# Patient Record
Sex: MALE | Race: WHITE | NOT HISPANIC OR LATINO | Employment: OTHER | URBAN - METROPOLITAN AREA
[De-identification: names, ages, dates, MRNs, and addresses within clinical notes are randomized per-mention and may not be internally consistent; named-entity substitution may affect disease eponyms.]

---

## 2017-04-12 ENCOUNTER — AMBULATORY - HEALTHEAST (OUTPATIENT)
Dept: INTERNAL MEDICINE | Facility: CLINIC | Age: 72
End: 2017-04-12

## 2017-04-12 DIAGNOSIS — Z12.5 ENCOUNTER FOR SCREENING FOR MALIGNANT NEOPLASM OF PROSTATE: ICD-10-CM

## 2017-04-12 DIAGNOSIS — Z00.00 PREVENTATIVE HEALTH CARE: ICD-10-CM

## 2017-04-12 DIAGNOSIS — E78.5 HYPERLIPIDEMIA: ICD-10-CM

## 2017-04-13 ENCOUNTER — AMBULATORY - HEALTHEAST (OUTPATIENT)
Dept: LAB | Facility: CLINIC | Age: 72
End: 2017-04-13

## 2017-04-13 DIAGNOSIS — Z00.00 PREVENTATIVE HEALTH CARE: ICD-10-CM

## 2017-04-13 DIAGNOSIS — E78.5 HYPERLIPIDEMIA: ICD-10-CM

## 2017-04-13 DIAGNOSIS — Z12.5 ENCOUNTER FOR SCREENING FOR MALIGNANT NEOPLASM OF PROSTATE: ICD-10-CM

## 2017-04-13 LAB
CHOLEST SERPL-MCNC: 220 MG/DL
FASTING STATUS PATIENT QL REPORTED: YES
HDLC SERPL-MCNC: 66 MG/DL
LDLC SERPL CALC-MCNC: 135 MG/DL
PSA SERPL-MCNC: 1.7 NG/ML (ref 0–6.5)
TRIGL SERPL-MCNC: 95 MG/DL

## 2017-04-19 ENCOUNTER — OFFICE VISIT - HEALTHEAST (OUTPATIENT)
Dept: INTERNAL MEDICINE | Facility: CLINIC | Age: 72
End: 2017-04-19

## 2017-04-19 DIAGNOSIS — Z00.00 ROUTINE GENERAL MEDICAL EXAMINATION AT A HEALTH CARE FACILITY: ICD-10-CM

## 2017-04-19 DIAGNOSIS — B00.1 HERPES LABIALIS: ICD-10-CM

## 2017-04-19 DIAGNOSIS — E78.5 HYPERLIPIDEMIA: ICD-10-CM

## 2017-04-19 LAB
ATRIAL RATE - MUSE: 64 BPM
DIASTOLIC BLOOD PRESSURE - MUSE: NORMAL MMHG
INTERPRETATION ECG - MUSE: NORMAL
P AXIS - MUSE: -2 DEGREES
PR INTERVAL - MUSE: 150 MS
QRS DURATION - MUSE: 92 MS
QT - MUSE: 402 MS
QTC - MUSE: 414 MS
R AXIS - MUSE: -1 DEGREES
SYSTOLIC BLOOD PRESSURE - MUSE: NORMAL MMHG
T AXIS - MUSE: 25 DEGREES
VENTRICULAR RATE- MUSE: 64 BPM

## 2017-04-19 ASSESSMENT — MIFFLIN-ST. JEOR: SCORE: 1636.72

## 2017-04-26 ENCOUNTER — COMMUNICATION - HEALTHEAST (OUTPATIENT)
Dept: INTERNAL MEDICINE | Facility: CLINIC | Age: 72
End: 2017-04-26

## 2017-04-30 ENCOUNTER — COMMUNICATION - HEALTHEAST (OUTPATIENT)
Dept: INTERNAL MEDICINE | Facility: CLINIC | Age: 72
End: 2017-04-30

## 2017-05-10 ENCOUNTER — COMMUNICATION - HEALTHEAST (OUTPATIENT)
Dept: INTERNAL MEDICINE | Facility: CLINIC | Age: 72
End: 2017-05-10

## 2017-05-22 ENCOUNTER — RECORDS - HEALTHEAST (OUTPATIENT)
Dept: ADMINISTRATIVE | Facility: OTHER | Age: 72
End: 2017-05-22

## 2017-06-27 ENCOUNTER — RECORDS - HEALTHEAST (OUTPATIENT)
Dept: ADMINISTRATIVE | Facility: OTHER | Age: 72
End: 2017-06-27

## 2017-11-26 ENCOUNTER — COMMUNICATION - HEALTHEAST (OUTPATIENT)
Dept: INTERNAL MEDICINE | Facility: CLINIC | Age: 72
End: 2017-11-26

## 2017-11-26 DIAGNOSIS — Z00.00 ROUTINE GENERAL MEDICAL EXAMINATION AT A HEALTH CARE FACILITY: ICD-10-CM

## 2017-11-26 DIAGNOSIS — B00.1 HERPES LABIALIS: ICD-10-CM

## 2018-04-12 ENCOUNTER — RECORDS - HEALTHEAST (OUTPATIENT)
Dept: ADMINISTRATIVE | Facility: OTHER | Age: 73
End: 2018-04-12

## 2018-04-25 ENCOUNTER — AMBULATORY - HEALTHEAST (OUTPATIENT)
Dept: INTERNAL MEDICINE | Facility: CLINIC | Age: 73
End: 2018-04-25

## 2018-04-25 DIAGNOSIS — Z00.00 MEDICARE ANNUAL WELLNESS VISIT, SUBSEQUENT: ICD-10-CM

## 2018-04-25 DIAGNOSIS — Z12.5 ENCOUNTER FOR SCREENING FOR MALIGNANT NEOPLASM OF PROSTATE: ICD-10-CM

## 2018-04-25 DIAGNOSIS — E78.5 HYPERLIPIDEMIA: ICD-10-CM

## 2018-04-26 ENCOUNTER — AMBULATORY - HEALTHEAST (OUTPATIENT)
Dept: LAB | Facility: CLINIC | Age: 73
End: 2018-04-26

## 2018-04-26 DIAGNOSIS — Z12.5 ENCOUNTER FOR SCREENING FOR MALIGNANT NEOPLASM OF PROSTATE: ICD-10-CM

## 2018-04-26 DIAGNOSIS — E78.5 HYPERLIPIDEMIA: ICD-10-CM

## 2018-04-26 DIAGNOSIS — Z00.00 MEDICARE ANNUAL WELLNESS VISIT, SUBSEQUENT: ICD-10-CM

## 2018-04-26 LAB
ALBUMIN SERPL-MCNC: 4.1 G/DL (ref 3.5–5)
ALBUMIN UR-MCNC: NEGATIVE MG/DL
ALP SERPL-CCNC: 54 U/L (ref 45–120)
ALT SERPL W P-5'-P-CCNC: 29 U/L (ref 0–45)
ANION GAP SERPL CALCULATED.3IONS-SCNC: 9 MMOL/L (ref 5–18)
APPEARANCE UR: CLEAR
AST SERPL W P-5'-P-CCNC: 25 U/L (ref 0–40)
BASOPHILS # BLD AUTO: 0 THOU/UL (ref 0–0.2)
BASOPHILS NFR BLD AUTO: 1 % (ref 0–2)
BILIRUB DIRECT SERPL-MCNC: 0.3 MG/DL
BILIRUB SERPL-MCNC: 0.9 MG/DL (ref 0–1)
BILIRUB UR QL STRIP: NEGATIVE
BUN SERPL-MCNC: 17 MG/DL (ref 8–28)
CALCIUM SERPL-MCNC: 9.7 MG/DL (ref 8.5–10.5)
CHLORIDE BLD-SCNC: 104 MMOL/L (ref 98–107)
CHOLEST SERPL-MCNC: 224 MG/DL
CO2 SERPL-SCNC: 28 MMOL/L (ref 22–31)
COLOR UR AUTO: YELLOW
CREAT SERPL-MCNC: 0.84 MG/DL (ref 0.7–1.3)
EOSINOPHIL # BLD AUTO: 0.1 THOU/UL (ref 0–0.4)
EOSINOPHIL NFR BLD AUTO: 2 % (ref 0–6)
ERYTHROCYTE [DISTWIDTH] IN BLOOD BY AUTOMATED COUNT: 12.4 % (ref 11–14.5)
ERYTHROCYTE [SEDIMENTATION RATE] IN BLOOD BY WESTERGREN METHOD: 2 MM/HR (ref 0–15)
FASTING STATUS PATIENT QL REPORTED: YES
GFR SERPL CREATININE-BSD FRML MDRD: >60 ML/MIN/1.73M2
GLUCOSE BLD-MCNC: 109 MG/DL (ref 70–125)
GLUCOSE UR STRIP-MCNC: NEGATIVE MG/DL
HCT VFR BLD AUTO: 43.7 % (ref 40–54)
HDLC SERPL-MCNC: 60 MG/DL
HGB BLD-MCNC: 15.2 G/DL (ref 14–18)
HGB UR QL STRIP: NEGATIVE
KETONES UR STRIP-MCNC: NEGATIVE MG/DL
LDLC SERPL CALC-MCNC: 145 MG/DL
LEUKOCYTE ESTERASE UR QL STRIP: NEGATIVE
LYMPHOCYTES # BLD AUTO: 1.6 THOU/UL (ref 0.8–4.4)
LYMPHOCYTES NFR BLD AUTO: 39 % (ref 20–40)
MCH RBC QN AUTO: 31.5 PG (ref 27–34)
MCHC RBC AUTO-ENTMCNC: 34.8 G/DL (ref 32–36)
MCV RBC AUTO: 91 FL (ref 80–100)
MONOCYTES # BLD AUTO: 0.3 THOU/UL (ref 0–0.9)
MONOCYTES NFR BLD AUTO: 8 % (ref 2–10)
NEUTROPHILS # BLD AUTO: 2.1 THOU/UL (ref 2–7.7)
NEUTROPHILS NFR BLD AUTO: 51 % (ref 50–70)
NITRATE UR QL: NEGATIVE
PH UR STRIP: 8 [PH] (ref 4.5–8)
PLATELET # BLD AUTO: 165 THOU/UL (ref 140–440)
PMV BLD AUTO: 9.6 FL (ref 8.5–12.5)
POTASSIUM BLD-SCNC: 4.5 MMOL/L (ref 3.5–5)
PROT SERPL-MCNC: 7.1 G/DL (ref 6–8)
PSA SERPL-MCNC: 1.8 NG/ML (ref 0–6.5)
RBC # BLD AUTO: 4.82 MILL/UL (ref 4.4–6.2)
SODIUM SERPL-SCNC: 141 MMOL/L (ref 136–145)
SP GR UR STRIP: 1 (ref 1–1.03)
TRIGL SERPL-MCNC: 95 MG/DL
UROBILINOGEN UR STRIP-ACNC: NORMAL
WBC: 4.1 THOU/UL (ref 4–11)

## 2018-04-30 ENCOUNTER — OFFICE VISIT - HEALTHEAST (OUTPATIENT)
Dept: INTERNAL MEDICINE | Facility: CLINIC | Age: 73
End: 2018-04-30

## 2018-04-30 DIAGNOSIS — Z00.00 MEDICARE ANNUAL WELLNESS VISIT, SUBSEQUENT: ICD-10-CM

## 2018-04-30 DIAGNOSIS — E78.5 HYPERLIPIDEMIA: ICD-10-CM

## 2018-04-30 DIAGNOSIS — G47.33 OSA (OBSTRUCTIVE SLEEP APNEA): ICD-10-CM

## 2018-04-30 DIAGNOSIS — B00.1 HERPES LABIALIS: ICD-10-CM

## 2018-04-30 LAB
ATRIAL RATE - MUSE: 67 BPM
DIASTOLIC BLOOD PRESSURE - MUSE: NORMAL MMHG
INTERPRETATION ECG - MUSE: NORMAL
P AXIS - MUSE: 73 DEGREES
PR INTERVAL - MUSE: 162 MS
QRS DURATION - MUSE: 88 MS
QT - MUSE: 400 MS
QTC - MUSE: 422 MS
R AXIS - MUSE: 3 DEGREES
SYSTOLIC BLOOD PRESSURE - MUSE: NORMAL MMHG
T AXIS - MUSE: 23 DEGREES
VENTRICULAR RATE- MUSE: 67 BPM

## 2018-07-12 ENCOUNTER — RECORDS - HEALTHEAST (OUTPATIENT)
Dept: ADMINISTRATIVE | Facility: OTHER | Age: 73
End: 2018-07-12

## 2019-07-19 ENCOUNTER — OFFICE VISIT - HEALTHEAST (OUTPATIENT)
Dept: INTERNAL MEDICINE | Facility: CLINIC | Age: 74
End: 2019-07-19

## 2019-07-19 DIAGNOSIS — Z00.00 ROUTINE GENERAL MEDICAL EXAMINATION AT A HEALTH CARE FACILITY: ICD-10-CM

## 2019-07-19 LAB
ALBUMIN SERPL-MCNC: 4.3 G/DL (ref 3.5–5)
ALBUMIN UR-MCNC: NEGATIVE MG/DL
ALP SERPL-CCNC: 71 U/L (ref 45–120)
ALT SERPL W P-5'-P-CCNC: 30 U/L (ref 0–45)
ANION GAP SERPL CALCULATED.3IONS-SCNC: 9 MMOL/L (ref 5–18)
APPEARANCE UR: CLEAR
AST SERPL W P-5'-P-CCNC: 24 U/L (ref 0–40)
BACTERIA #/AREA URNS HPF: ABNORMAL HPF
BILIRUB SERPL-MCNC: 0.8 MG/DL (ref 0–1)
BILIRUB UR QL STRIP: NEGATIVE
BUN SERPL-MCNC: 19 MG/DL (ref 8–28)
CALCIUM SERPL-MCNC: 10.2 MG/DL (ref 8.5–10.5)
CHLORIDE BLD-SCNC: 103 MMOL/L (ref 98–107)
CHOLEST SERPL-MCNC: 215 MG/DL
CO2 SERPL-SCNC: 29 MMOL/L (ref 22–31)
COLOR UR AUTO: YELLOW
CREAT SERPL-MCNC: 0.82 MG/DL (ref 0.7–1.3)
ERYTHROCYTE [DISTWIDTH] IN BLOOD BY AUTOMATED COUNT: 11.9 % (ref 11–14.5)
FASTING STATUS PATIENT QL REPORTED: YES
GFR SERPL CREATININE-BSD FRML MDRD: >60 ML/MIN/1.73M2
GLUCOSE BLD-MCNC: 100 MG/DL (ref 70–125)
GLUCOSE UR STRIP-MCNC: NEGATIVE MG/DL
HCT VFR BLD AUTO: 45.4 % (ref 40–54)
HDLC SERPL-MCNC: 68 MG/DL
HGB BLD-MCNC: 15.6 G/DL (ref 14–18)
HGB UR QL STRIP: ABNORMAL
KETONES UR STRIP-MCNC: NEGATIVE MG/DL
LDLC SERPL CALC-MCNC: 126 MG/DL
LEUKOCYTE ESTERASE UR QL STRIP: NEGATIVE
MCH RBC QN AUTO: 31.3 PG (ref 27–34)
MCHC RBC AUTO-ENTMCNC: 34.3 G/DL (ref 32–36)
MCV RBC AUTO: 91 FL (ref 80–100)
NITRATE UR QL: NEGATIVE
PH UR STRIP: 7 [PH] (ref 5–8)
PLATELET # BLD AUTO: 195 THOU/UL (ref 140–440)
PMV BLD AUTO: 7.2 FL (ref 7–10)
POTASSIUM BLD-SCNC: 4.7 MMOL/L (ref 3.5–5)
PROT SERPL-MCNC: 6.8 G/DL (ref 6–8)
PSA SERPL-MCNC: 1.6 NG/ML (ref 0–6.5)
RBC # BLD AUTO: 4.97 MILL/UL (ref 4.4–6.2)
RBC #/AREA URNS AUTO: ABNORMAL HPF
SODIUM SERPL-SCNC: 141 MMOL/L (ref 136–145)
SP GR UR STRIP: 1.02 (ref 1–1.03)
SQUAMOUS #/AREA URNS AUTO: ABNORMAL LPF
TRIGL SERPL-MCNC: 103 MG/DL
TSH SERPL DL<=0.005 MIU/L-ACNC: 0.93 UIU/ML (ref 0.3–5)
UROBILINOGEN UR STRIP-ACNC: ABNORMAL
WBC #/AREA URNS AUTO: ABNORMAL HPF
WBC: 5 THOU/UL (ref 4–11)

## 2019-07-19 ASSESSMENT — MIFFLIN-ST. JEOR: SCORE: 1623.5

## 2019-07-22 ENCOUNTER — COMMUNICATION - HEALTHEAST (OUTPATIENT)
Dept: INTERNAL MEDICINE | Facility: CLINIC | Age: 74
End: 2019-07-22

## 2019-08-05 ENCOUNTER — COMMUNICATION - HEALTHEAST (OUTPATIENT)
Dept: INTERNAL MEDICINE | Facility: CLINIC | Age: 74
End: 2019-08-05

## 2019-08-05 DIAGNOSIS — E78.5 HYPERLIPIDEMIA: ICD-10-CM

## 2019-09-12 ENCOUNTER — OFFICE VISIT - HEALTHEAST (OUTPATIENT)
Dept: CARDIOLOGY | Facility: CLINIC | Age: 74
End: 2019-09-12

## 2019-09-12 DIAGNOSIS — G47.33 OSA ON CPAP: ICD-10-CM

## 2019-09-12 DIAGNOSIS — E78.00 PURE HYPERCHOLESTEROLEMIA: ICD-10-CM

## 2019-09-12 DIAGNOSIS — R00.0 TACHYCARDIA: ICD-10-CM

## 2019-09-12 ASSESSMENT — MIFFLIN-ST. JEOR: SCORE: 1600.82

## 2019-09-13 ENCOUNTER — AMBULATORY - HEALTHEAST (OUTPATIENT)
Dept: INTERNAL MEDICINE | Facility: CLINIC | Age: 74
End: 2019-09-13

## 2019-09-19 ENCOUNTER — HOSPITAL ENCOUNTER (OUTPATIENT)
Dept: CARDIOLOGY | Facility: CLINIC | Age: 74
Discharge: HOME | End: 2019-09-19
Attending: INTERNAL MEDICINE

## 2019-09-19 LAB
CV STRESS CURRENT BP HE: NORMAL
CV STRESS CURRENT HR HE: 102
CV STRESS CURRENT HR HE: 109
CV STRESS CURRENT HR HE: 110
CV STRESS CURRENT HR HE: 112
CV STRESS CURRENT HR HE: 113
CV STRESS CURRENT HR HE: 114
CV STRESS CURRENT HR HE: 116
CV STRESS CURRENT HR HE: 116
CV STRESS CURRENT HR HE: 119
CV STRESS CURRENT HR HE: 122
CV STRESS CURRENT HR HE: 126
CV STRESS CURRENT HR HE: 132
CV STRESS CURRENT HR HE: 134
CV STRESS CURRENT HR HE: 138
CV STRESS CURRENT HR HE: 139
CV STRESS CURRENT HR HE: 146
CV STRESS CURRENT HR HE: 154
CV STRESS CURRENT HR HE: 157
CV STRESS CURRENT HR HE: 68
CV STRESS CURRENT HR HE: 85
CV STRESS CURRENT HR HE: 87
CV STRESS CURRENT HR HE: 89
CV STRESS CURRENT HR HE: 92
CV STRESS CURRENT HR HE: 99
CV STRESS CURRENT HR HE: 99
CV STRESS DEVIATION TIME HE: NORMAL
CV STRESS ECHO PERCENT HR HE: NORMAL
CV STRESS EXERCISE STAGE HE: NORMAL
CV STRESS FINAL RESTING BP HE: NORMAL
CV STRESS FINAL RESTING HR HE: 85
CV STRESS MAX HR HE: 157
CV STRESS MAX TREADMILL GRADE HE: 16
CV STRESS MAX TREADMILL SPEED HE: 4.2
CV STRESS PEAK DIA BP HE: NORMAL
CV STRESS PEAK SYS BP HE: NORMAL
CV STRESS PHASE HE: NORMAL
CV STRESS PROTOCOL HE: NORMAL
CV STRESS RESTING PT POSITION HE: NORMAL
CV STRESS RESTING PT POSITION HE: NORMAL
CV STRESS ST DEVIATION AMOUNT HE: NORMAL
CV STRESS ST DEVIATION ELEVATION HE: NORMAL
CV STRESS ST EVELATION AMOUNT HE: NORMAL
CV STRESS TEST TYPE HE: NORMAL
CV STRESS TOTAL STAGE TIME MIN 1 HE: NORMAL
ECHO EJECTION FRACTION ESTIMATED: 60 %
STRESS ECHO BASELINE BP: NORMAL
STRESS ECHO BASELINE HR: 65
STRESS ECHO CALCULATED PERCENT HR: 108 %
STRESS ECHO LAST STRESS BP: NORMAL
STRESS ECHO LAST STRESS HR: 157
STRESS ECHO POST ESTIMATED WORKLOAD: 11.9
STRESS ECHO POST EXERCISE DUR MIN: 10
STRESS ECHO POST EXERCISE DUR SEC: 11
STRESS ECHO TARGET HR: 124

## 2020-04-30 ENCOUNTER — OFFICE VISIT - HEALTHEAST (OUTPATIENT)
Dept: INTERNAL MEDICINE | Facility: CLINIC | Age: 75
End: 2020-04-30

## 2020-04-30 DIAGNOSIS — E88.810 METABOLIC SYNDROME: ICD-10-CM

## 2020-06-24 ENCOUNTER — COMMUNICATION - HEALTHEAST (OUTPATIENT)
Dept: INTERNAL MEDICINE | Facility: CLINIC | Age: 75
End: 2020-06-24

## 2020-06-24 DIAGNOSIS — B00.1 HERPES LABIALIS: ICD-10-CM

## 2020-06-24 DIAGNOSIS — E78.5 HYPERLIPIDEMIA: ICD-10-CM

## 2020-06-24 RX ORDER — VALACYCLOVIR HYDROCHLORIDE 1 G/1
TABLET, FILM COATED ORAL
Qty: 30 TABLET | Refills: 0 | Status: SHIPPED | OUTPATIENT
Start: 2020-06-24 | End: 2021-08-06

## 2020-08-27 ENCOUNTER — COMMUNICATION - HEALTHEAST (OUTPATIENT)
Dept: INTERNAL MEDICINE | Facility: CLINIC | Age: 75
End: 2020-08-27

## 2020-08-27 DIAGNOSIS — E78.5 HYPERLIPIDEMIA: ICD-10-CM

## 2020-08-27 RX ORDER — ATORVASTATIN CALCIUM 20 MG/1
20 TABLET, FILM COATED ORAL DAILY
Qty: 90 TABLET | Refills: 3 | Status: SHIPPED | OUTPATIENT
Start: 2020-08-27

## 2020-10-26 ENCOUNTER — RECORDS - HEALTHEAST (OUTPATIENT)
Dept: ADMINISTRATIVE | Facility: OTHER | Age: 75
End: 2020-10-26

## 2020-10-29 ENCOUNTER — RECORDS - HEALTHEAST (OUTPATIENT)
Dept: ADMINISTRATIVE | Facility: OTHER | Age: 75
End: 2020-10-29

## 2020-11-03 ENCOUNTER — RECORDS - HEALTHEAST (OUTPATIENT)
Dept: ADMINISTRATIVE | Facility: OTHER | Age: 75
End: 2020-11-03

## 2020-12-14 ENCOUNTER — RECORDS - HEALTHEAST (OUTPATIENT)
Dept: ADMINISTRATIVE | Facility: OTHER | Age: 75
End: 2020-12-14

## 2021-05-22 ENCOUNTER — HEALTH MAINTENANCE LETTER (OUTPATIENT)
Age: 76
End: 2021-05-22

## 2021-05-26 VITALS — DIASTOLIC BLOOD PRESSURE: 78 MMHG | SYSTOLIC BLOOD PRESSURE: 130 MMHG

## 2021-05-30 VITALS — HEIGHT: 69 IN | WEIGHT: 200.04 LBS | BODY MASS INDEX: 29.63 KG/M2

## 2021-05-30 NOTE — PATIENT INSTRUCTIONS - HE
Advance Directive  Patient s advance directive was discussed and I am comfortable with the patient s wishes.  Patient Education   Personalized Prevention Plan  You are due for the preventive services outlined below.  Your care team is available to assist you in scheduling these services.  If you have already completed any of these items, please share that information with your care team to update in your medical record.  Health Maintenance   Topic Date Due     ZOSTER VACCINES (2 of 3) 08/07/2008     COLONOSCOPY  11/13/2019     INFLUENZA VACCINE RULE BASED (1) 08/01/2019     FALL RISK ASSESSMENT  07/19/2020     TD 18+ HE  02/13/2023     ADVANCE DIRECTIVES DISCUSSED WITH PATIENT  04/30/2023     PNEUMOCOCCAL POLYSACCHARIDE VACCINE AGE 65 AND OVER  Completed     PNEUMOCOCCAL CONJUGATE VACCINE FOR ADULTS (PCV13 OR PREVNAR)  Completed

## 2021-05-30 NOTE — PROGRESS NOTES
Assessment and Plan:   Annual wellness visit.    Tachycardia yesterday.  Lasting 2 hours heart rate up to 170.  Positive family history and one son with an accessory pathway.  Needs cardiac consultation.  Orders written.    1. Routine general medical examination at a health care facility  Annual wellness visit  - HM2(CBC w/o Differential)  - Comprehensive Metabolic Panel  - Lipid Cascade  - Thyroid Stimulating Hormone (TSH)  - Urinalysis-UC if Indicated  - PSA (Prostatic-Specific Antigen), Annual Screen  - Ambulatory referral to Cardiology     The patient's current medical problems were reviewed.    I have had an Advance Directives discussion with the patient.  The following health maintenance schedule was reviewed with the patient and provided in printed form in the after visit summary:   Health Maintenance   Topic Date Due     ZOSTER VACCINES (2 of 3) 08/07/2008     COLONOSCOPY  11/13/2019     INFLUENZA VACCINE RULE BASED (1) 08/01/2019     FALL RISK ASSESSMENT  07/19/2020     TD 18+ HE  02/13/2023     ADVANCE DIRECTIVES DISCUSSED WITH PATIENT  04/30/2023     PNEUMOCOCCAL POLYSACCHARIDE VACCINE AGE 65 AND OVER  Completed     PNEUMOCOCCAL CONJUGATE VACCINE FOR ADULTS (PCV13 OR PREVNAR)  Completed        Subjective:   Chief Complaint: Parviz Joy is an 74 y.o. male here for an Annual Wellness visit.   HPI: Annual wellness visit.    Tachycardia yesterday to 170.  2 hours duration.  No associated symptoms of chest pain or shortness of breath no syncope spells no lightheadedness.  74 years of age.  Prior spell 5 years ago.  Positive family history for one son with an accessory pathway.    Blood pressure elevated on recheck times two 144/74 may be a candidate for beta-blocker therapy.    Previously seen by Dr. Chisholm.  Non-smoker.    Alcohol 5 days out of 7 days measures alcohol each night 1 to 2 ounces.    Allergies none known.  No drug allergies.  Last colonoscopy dated November 13, 2014 showed  diverticulosis prior history of colon polyps no cancer.    Appendectomy.    Left knee scope.    Hyperlipidemia.    Mother  83 heart disease.    Father  69 cancer of the sinuses disfiguring.  2 children ages 4946 daughter and son respectively.  6 grandchildren.  One son with accessory pathway.  Tachycardia spells.    Review of Systems:    Please see above.  The rest of the review of systems are negative for all systems.    Patient Care Team:  Zeke Chisholm MD as PCP - General (Internal Medicine)     Patient Active Problem List   Diagnosis     Hyperlipidemia     LANCE on CPAP     Past Medical History:   Diagnosis Date     Colon polyposis      History of liver biopsy     hx abnl lfts....fatty liver. highiron   genetic scrren negative for hemochromatisis   hep c negative     Hyperlipidemia      Sleep apnea       Past Surgical History:   Procedure Laterality Date     APPENDECTOMY      AGE 30     CHOLECYSTECTOMY       KNEE ARTHROSCOPY Left       Family History   Problem Relation Age of Onset     Stroke Mother      Peripheral vascular disease Mother      Diabetes Father      Cancer Father         NASOPHARYNGEAL     Diabetes Brother      Arrhythmia Son       Social History     Socioeconomic History     Marital status:      Spouse name: Not on file     Number of children: Not on file     Years of education: Not on file     Highest education level: Not on file   Occupational History     Not on file   Social Needs     Financial resource strain: Not on file     Food insecurity:     Worry: Not on file     Inability: Not on file     Transportation needs:     Medical: Not on file     Non-medical: Not on file   Tobacco Use     Smoking status: Former Smoker     Last attempt to quit: 1969     Years since quittin.5   Substance and Sexual Activity     Alcohol use: Yes     Drug use: Not on file     Sexual activity: Not on file   Lifestyle     Physical activity:     Days per week: Not on file     Minutes per  "session: Not on file     Stress: Not on file   Relationships     Social connections:     Talks on phone: Not on file     Gets together: Not on file     Attends Faith service: Not on file     Active member of club or organization: Not on file     Attends meetings of clubs or organizations: Not on file     Relationship status: Not on file     Intimate partner violence:     Fear of current or ex partner: Not on file     Emotionally abused: Not on file     Physically abused: Not on file     Forced sexual activity: Not on file   Other Topics Concern     Not on file   Social History Narrative    [place] 1968    [place] [place] [employer]    [employer]    [place] [employer]    2 CHILDREN    NADYA-ARRHYTHMIA, ECTOPY    ERIN    6 GRANDCHILDREN      Current Outpatient Medications   Medication Sig Dispense Refill     aspirin 81 MG EC tablet Take 162 mg by mouth daily.        atorvastatin (LIPITOR) 20 MG tablet Take 1 tablet (20 mg total) by mouth daily. 90 tablet 3     cholecalciferol, vitamin D3, 1,000 unit tablet Take 1,000 Units by mouth daily.       co-enzyme Q-10 30 mg capsule Take 100 mg by mouth daily.       multivitamin therapeutic (THERAGRAN) tablet Take 1 tablet by mouth daily.       fluticasone (FLONASE) 50 mcg/actuation nasal spray 1 spray into each nostril as needed.        valACYclovir (VALTREX) 1000 MG tablet TAKE 1 TABLET(1000 MG) BY MOUTH TWICE DAILY PRN 30 tablet 6     No current facility-administered medications for this visit.       Objective:   Vital Signs:   Visit Vitals  /74   Pulse 76   Ht 5' 9\" (1.753 m)   Wt 198 lb (89.8 kg)   SpO2 98%   BMI 29.24 kg/m         VisionScreening:  No exam data present     PHYSICAL EXAM  Chest clear to auscultation and percussion.  Heart tones regular rhythm without murmur rub or gallop.  Abdomen soft nontender no organomegaly.  No peritoneal signs.  Extremities free of edema cyanosis or clubbing.  Neck veins " nondistended no thyromegaly or scleral icterus noted, carotids full.  Skin warm and dry easily conversant good spirited.  Normal intelligence.  Neurologically intact no gross localizing findings.  Heart rate today 76 blood pressure initially 160/80 recheck 144/74.  O2 sats 98% respiratory rate 18 unlabored distinguished appearing elderly male in no acute distress male pattern baldness wears glasses.  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  Works with his son.  Skin negative lymph negative neuro negative psych normal prostate mild enlargement no nodularity genital rectal exam negative right inguinal hernia noted reducible Dr. Rey Villasenor's name given for consideration subsequent to cardiac work-up done.  No carotid bruits thyromegaly eyes ears nose throat exam negative good pulse noted in all 4 extremities mild trace edema noted.    Assessment Results 7/19/2019   Activities of Daily Living No help needed   Instrumental Activities of Daily Living No help needed   Get Up and Go Score Less than 12 seconds   Mini Cog Total Score 3   Some recent data might be hidden     A Mini-Cog score of 0-2 suggests the possibility of dementia, score of 3-5 suggests no dementia    Identified Health Risks:     Patient's advanced directive was discussed and I am comfortable with the patient's wishes.    Needs cardiac consultation for tachycardia with mild systolic hypertension.    Needs right inguinal hernia assessment found today on exam with Dr. Rey Villasenor.  May be a candidate for beta-blocker therapy for #1.

## 2021-05-31 NOTE — TELEPHONE ENCOUNTER
Refill Approved    Rx renewed per Medication Renewal Policy. Medication was last renewed on 4/30/2018 for 90/3.  Last OV 7/19/2019 PE  Britta Stone, [facility] Triage/Med Refill 8/6/2019     Requested Prescriptions   Pending Prescriptions Disp Refills     atorvastatin (LIPITOR) 20 MG tablet [Pharmacy Med Name: ATORVASTATIN 20MG TABLETS] 90 tablet 0     Sig: TAKE 1 TABLET(20 MG) BY MOUTH DAILY       Statins Refill Protocol (Hmg CoA Reductase Inhibitors) Passed - 8/5/2019 11:06 AM        Passed - PCP or prescribing provider visit in past 12 months      Last office visit with prescriber/PCP: Visit date not found OR same dept: Visit date not found OR same specialty: Visit date not found  Last physical: 4/30/2018 Last MTM visit: Visit date not found   Next visit within 3 mo: Visit date not found  Next physical within 3 mo: Visit date not found  Prescriber OR PCP: Zeke Chisholm MD  Last diagnosis associated with med order: 1. Hyperlipidemia  - atorvastatin (LIPITOR) 20 MG tablet [Pharmacy Med Name: ATORVASTATIN 20MG TABLETS]; TAKE 1 TABLET(20 MG) BY MOUTH DAILY  Dispense: 90 tablet; Refill: 0    If protocol passes may refill for 12 months if within 3 months of last provider visit (or a total of 15 months).

## 2021-06-01 NOTE — PROGRESS NOTES
[facility] Heart Care Note    Assessment:  3 episodes of tachycardia seems similar, abrupt onset with heart rate of about 170 that seem rather regular and likely represents paroxysmal supraventricular tachycardia, cannot exclude atrial fib/flutter; no reason to suspect ventricular tachycardia  Normal baseline electrocardiogram  Normal physical examination  Hyperlipidemia-controlled with atorvastatin  Obstructive sleep apnea with CPAP    Plan:    There is  no evidence for structural heart disease or organic heart disease   you have Had 3 episodes of abrupt tachycardia  This likely represents paroxysmal supraventricular tachycardia although atrial fibrillation/flutter is also possibility  The tachycardia Rhythm has not been characterized  Baseline EKG is normal  Examination is normal  Obtain stress echocardiogram to further evaluate left ventricular strength and  response to exercise  We discussed management of tachycardia  For now, will just be carefully observant  Could try empiric medical suppression with calcium channel blocker beta-blocker although episodes are so infrequent  Might be candidate for comprehensive electrophysiologic study this would be definitive as to mechanism of the tachycardia and if a reentrant type pathway is present, ablation could be done: You do not seem to have enough tachycardia or often enough episodes to warrant ablation at this time and also, would like to characterize arrhythmia for the EP study    Son has supraventricular tachycardia since age 14.  Has episodes almost on a monthly basis seen a cardiologist been recommended for EP/ablation      Subjective:    I had the opportunity to see.Parviz Joy , who is a 74 y.o. male with a known history of   6 weeks ago he went to bed feeling fine and awoke at 4 AM with his heart racing.  He had a watch that showed his heart rate is 175 bpm and he did feel the fast pounding sensation that seemed rather regular.  Later it dropped into  the 140 bpm range and then terminated the whole episode lasted 1.5 hours  He had a similar episode about 7 years ago while in [place]-this also self terminated and he did not seek medical attention  He apparently had one other episode that was similar  He has no history of organic heart disease denies rheumatic fever heart murmurs  Is not had any cardiac evaluation except electrocardiograms.    Physically he is quite active and fit and can walk on the treadmill at a high level without difficulty.  He has no symptoms like angina, no excessive breathlessness excessive fatigability  No orthopnea PND pedal edema  Does have elevated cholesterol for which he takes atorvastatin with recent LDL equals 126, HDL 68 and total cholesterol 215; July 19, 2019   He has noted his blood pressure has been a bit elevated but not to the high blood pressure range   Does not use tobacco   Minimal alcohol  No family history early coronary artery disease  Continues to work, does quite a bit of traveling sometimes  goes to [place]  His granddaughter is quite accomplished  in place for [employer]; he plans on watching her compete this winter      Problem List:  Patient Active Problem List   Diagnosis     Hyperlipidemia     LANCE on CPAP     Tachycardia     Medical History:  Past Medical History:   Diagnosis Date     Colon polyposis      History of liver biopsy     hx abnl lfts....fatty liver. highiron   genetic scrren negative for hemochromatisis   hep c negative     Hyperlipidemia      Sleep apnea 2001     Surgical History:  Past Surgical History:   Procedure Laterality Date     APPENDECTOMY      AGE 30     CHOLECYSTECTOMY  1995     KNEE ARTHROSCOPY Left      Social History:  Social History     Socioeconomic History     Marital status:      Spouse name: Not on file     Number of children: Not on file     Years of education: Not on file     Highest education level: Not on file   Occupational History     Not on file    Social Needs     Financial resource strain: Not on file     Food insecurity:     Worry: Not on file     Inability: Not on file     Transportation needs:     Medical: Not on file     Non-medical: Not on file   Tobacco Use     Smoking status: Former Smoker     Last attempt to quit:      Years since quittin.7   Substance and Sexual Activity     Alcohol use: Yes     Drug use: Not on file     Sexual activity: Not on file   Lifestyle     Physical activity:     Days per week: Not on file     Minutes per session: Not on file     Stress: Not on file   Relationships     Social connections:     Talks on phone: Not on file     Gets together: Not on file     Attends Advent service: Not on file     Active member of club or organization: Not on file     Attends meetings of clubs or organizations: Not on file     Relationship status: Not on file     Intimate partner violence:     Fear of current or ex partner: Not on file     Emotionally abused: Not on file     Physically abused: Not on file     Forced sexual activity: Not on file   Other Topics Concern     Not on file   Social History Narrative    [place] 1968    [place] [employer]    [employer]    [employer]    [place] [employer]    2 CHILDREN    NADYA-ARRHYTHMIA, ECTOPY    ERIN    6 GRANDCHILDREN       Review of Systems:      General: WNL  Eyes: WNL  Ears/Nose/Throat: WNL  Lungs: WNL  Heart: Irregular Heartbeat(one time of racing heart. )  Stomach: WNL  Bladder: WNL  Muscle/Joints: WNL  Skin: WNL  Nervous System: WNL  Mental Health: WNL     Blood: WNL        Family History:  Family History   Problem Relation Age of Onset     Stroke Mother      Peripheral vascular disease Mother      Diabetes Father      Cancer Father         NASOPHARYNGEAL     Diabetes Brother      Arrhythmia Son          Allergies:  No Known Allergies    Medications:  Current Outpatient Medications   Medication Sig Dispense Refill     aspirin 81 MG EC  "tablet Take 162 mg by mouth daily.        atorvastatin (LIPITOR) 20 MG tablet Take 1 tablet (20 mg total) by mouth daily. 90 tablet 3     cholecalciferol, vitamin D3, 1,000 unit tablet Take 1,000 Units by mouth daily.       co-enzyme Q-10 30 mg capsule Take 100 mg by mouth daily.       multivitamin therapeutic (THERAGRAN) tablet Take 1 tablet by mouth daily.       valACYclovir (VALTREX) 1000 MG tablet TAKE 1 TABLET(1000 MG) BY MOUTH TWICE DAILY PRN 30 tablet 6     fluticasone (FLONASE) 50 mcg/actuation nasal spray 1 spray into each nostril as needed.        No current facility-administered medications for this visit.        Objective:   Vital signs:  /76   Pulse 65   Ht 5' 9\" (1.753 m)   Wt 193 lb (87.5 kg)   SpO2 95%   BMI 28.50 kg/m        Physical Exam     GENERAL APPEARANCE: Alert, cooperative and in no acute distress.  HEENT: No scleral icterus. No Xanthelasma. Oral mucous membranes pink and moist.  NECK: JVP normal cm. No Hepatojugular reflux. Thyroid not  Palpable  CHEST: clear to auscultation and percussion  CARDIOVASCULAR: S1, S2 without murmur    Brachial, radial  pulses are intact and symmetric.   No carotid bruits noted.  ABDOMEN: Non tender. BS+. No bruits.  EXTREMITIES: No cyanosis, clubbing or edema.    Lab Results:  LIPIDS:  Lab Results   Component Value Date    CHOL 215 (H) 07/19/2019    CHOL 224 (H) 04/26/2018    CHOL 220 (H) 04/13/2017     Lab Results   Component Value Date    HDL 68 07/19/2019    HDL 60 04/26/2018    HDL 66 04/13/2017     Lab Results   Component Value Date    LDLCALC 126 07/19/2019    LDLCALC 145 (H) 04/26/2018    LDLCALC 135 (H) 04/13/2017     Lab Results   Component Value Date    TRIG 103 07/19/2019    TRIG 95 04/26/2018    TRIG 95 04/13/2017     No components found for: CHOLHDL    BMP:  Lab Results   Component Value Date    CREATININE 0.82 07/19/2019    BUN 19 07/19/2019     07/19/2019    K 4.7 07/19/2019     07/19/2019    CO2 29 07/19/2019         This " note has been dictated using voice recognition software. Any grammatical or context distortions are unintentional and inherent to the software.  Adrina Barker MD  [facility]  [phone]

## 2021-06-01 NOTE — PATIENT INSTRUCTIONS - HE
Parviz Joy    Thank you for coming to the [facility] today for a cardiac evaluation  It was my pleasure to see you today  A good contact for any questions would be Rossy Javed  RN @ [phone]    There is  no evidence for structural heart disease or organic heart disease   you have Had 3 episodes of abrupt tachycardia  This likely represents paroxysmal supraventricular tachycardia although atrial fibrillation/flutter is also possibility  The tachycardia Rhythm has not been characterized  Baseline EKG is normal  Examination is normal  Obtain stress echocardiogram to further evaluate left ventricular strength and  response to exercise  We discussed management of tachycardia  For now, will just be carefully observant  Could try empiric medical suppression with calcium channel blocker beta-blocker although episodes are so infrequent  Might be candidate for comprehensive electrophysiologic study this would be definitive as to mechanism of the tachycardia and if a reentrant type pathway is present, ablation could be done: You do not seem to have enough tachycardia or often enough episodes to warrant ablation at this time and also, would like to characterize arrhythmia for the EP study

## 2021-06-01 NOTE — PROGRESS NOTES
[facility] Heart Care Note     Assessment:  3 episodes of tachycardia seems similar, abrupt onset with heart rate of about 170 that seem rather regular and likely represents paroxysmal supraventricular tachycardia, cannot exclude atrial fib/flutter; no reason to suspect ventricular tachycardia  Normal baseline electrocardiogram  Normal physical examination  Hyperlipidemia-controlled with atorvastatin  Obstructive sleep apnea with CPAP     Plan:     There is  no evidence for structural heart disease or organic heart disease   you have Had 3 episodes of abrupt tachycardia  This likely represents paroxysmal supraventricular tachycardia although atrial fibrillation/flutter is also possibility  The tachycardia Rhythm has not been characterized  Baseline EKG is normal  Examination is normal  Obtain stress echocardiogram to further evaluate left ventricular strength and  response to exercise  We discussed management of tachycardia  For now, will just be carefully observant  Could try empiric medical suppression with calcium channel blocker beta-blocker although episodes are so infrequent  Might be candidate for comprehensive electrophysiologic study this would be definitive as to mechanism of the tachycardia and if a reentrant type pathway is present, ablation could be done: You do not seem to have enough tachycardia or often enough episodes to warrant ablation at this time and also, would like to characterize arrhythmia for the EP study     Son has supraventricular tachycardia since age 14.  Has episodes almost on a monthly basis seen a cardiologist been recommended for EP/ablation

## 2021-06-03 VITALS
BODY MASS INDEX: 28.58 KG/M2 | OXYGEN SATURATION: 95 % | WEIGHT: 193 LBS | HEART RATE: 65 BPM | HEIGHT: 69 IN | SYSTOLIC BLOOD PRESSURE: 140 MMHG | DIASTOLIC BLOOD PRESSURE: 76 MMHG

## 2021-06-03 VITALS — BODY MASS INDEX: 29.33 KG/M2 | HEIGHT: 69 IN | WEIGHT: 198 LBS

## 2021-06-07 NOTE — PROGRESS NOTES
"Parviz Joy is a 74 y.o. male who is being evaluated via a billable telephone visit.      The patient has been notified of following:     \"This telephone visit will be conducted via a call between you and your physician/provider. We have found that certain health care needs can be provided without the need for a physical exam.  This service lets us provide the care you need with a short phone conversation.  If a prescription is necessary we can send it directly to your pharmacy.  If lab work is needed we can place an order for that and you can then stop by our lab to have the test done at a later time.    Telephone visits are billed at different rates depending on your insurance coverage. During this emergency period, for some insurers they may be billed the same as an in-person visit.  Please reach out to your insurance provider with any questions.    If during the course of the call the physician/provider feels a telephone visit is not appropriate, you will not be charged for this service.\"    Patient has given verbal consent to a Telephone visit? Yes    Patient would like to receive their AVS by AVS Preference: Kim.    Additional provider notes: Metabolic syndrome.    Hyperlipidemia.    History of atrial arrhythmia supraventricular tachycardia.  Previously seen by cardiology and was told that an ablation would be in order the patient declined.    Recent colonoscopy at the [facility] 1 isolated small benign polyp removed.    No chest pain or shortness of breath no blood in stool or urine.    Medication list reviewed well-tolerated.  Statin medication will be up for refill August 2020.    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    Assessment/Plan:  Hyperlipidemia with history of atrial arrhythmia and colon polyps.  Call if problems persists follow-up 4 to 6 months time.  Discussed coronavirus 19 pandemic and ways for preventing and " shown of illness including good handwashing plus social distancing and keeping eyes and nose and mouth free from hands and fingers.      Phone call duration:  11 minutes    Jackie Mills, HEMANT

## 2021-06-09 ENCOUNTER — RECORDS - HEALTHEAST (OUTPATIENT)
Dept: CARDIOLOGY | Facility: CLINIC | Age: 76
End: 2021-06-09

## 2021-06-09 DIAGNOSIS — R00.2 PALPITATIONS: ICD-10-CM

## 2021-06-09 NOTE — TELEPHONE ENCOUNTER
Refill Approved    Rx renewed per Medication Renewal Policy. Medication was last renewed on 4/30/18.    Kathrine Salguero, [facility] Connection Triage/Med Refill 6/24/2020     Requested Prescriptions   Pending Prescriptions Disp Refills     valACYclovir (VALTREX) 1000 MG tablet [Pharmacy Med Name: VALACYCLOVIR 1GM TABLETS] 30 tablet 6     Sig: TAKE 1 TABLET(1000 MG) BY MOUTH TWICE DAILY AS NEEDED       Antivirals Refill Protocol Passed - 6/24/2020 11:26 AM        Passed - Renal function done in last year     Creatinine   Date Value Ref Range Status   07/19/2019 0.82 0.70 - 1.30 mg/dL Final             Passed - Visit with PCP or prescribing provider visit in past 12 months or next 3 months     Last office visit with prescriber/PCP: Visit date not found OR same dept: Visit date not found OR same specialty: Visit date not found  Last physical: 4/30/2018 Last MTM visit: Visit date not found   Next visit within 3 mo: Visit date not found  Next physical within 3 mo: Visit date not found  Prescriber OR PCP: Zeke Chisholm MD  Last diagnosis associated with med order: 1. Hyperlipidemia  - valACYclovir (VALTREX) 1000 MG tablet [Pharmacy Med Name: VALACYCLOVIR 1GM TABLETS]; TAKE 1 TABLET(1000 MG) BY MOUTH TWICE DAILY AS NEEDED  Dispense: 30 tablet; Refill: 6    2. Herpes labialis  - valACYclovir (VALTREX) 1000 MG tablet [Pharmacy Med Name: VALACYCLOVIR 1GM TABLETS]; TAKE 1 TABLET(1000 MG) BY MOUTH TWICE DAILY AS NEEDED  Dispense: 30 tablet; Refill: 6    If protocol passes may refill for 12 months if within 3 months of last provider visit (or a total of 15 months).

## 2021-06-10 NOTE — PROGRESS NOTES
Assessment and Plan:        1. 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 Hyperlipidemia  Primary prevention without issue  Discussed goals.  Discussed possibility of going to 40 mg.  - atorvastatin (LIPITOR) 20 MG tablet; Take 1 tablet (20 mg total) by mouth daily.  Dispense: 90 tablet; Refill: 3  - valACYclovir (VALTREX) 1000 MG tablet; TAKE 1 TABLET(1000 MG) BY MOUTH TWICE DAILY PRN  Dispense: 30 tablet; Refill: 6  - Electrocardiogram Perform - Clinic    3. Herpes labialis  Chronic recurrent.  Responds well to Valtrex  - valACYclovir (VALTREX) 1000 MG tablet; TAKE 1 TABLET(1000 MG) BY MOUTH TWICE DAILY PRN  Dispense: 30 tablet; Refill: 6      4.  Obstructive sleep apnea    Doing well    Patient has obstructive sleep apnea  Uses CPAP machine regularly  Receiving benefit from it.  Should continue its usage.        Zeke Chisholm MD  Internal medicine  [facility]  [phone]  [email or web address]  The patient's current medical problems were reviewed.    In addition to the wellness portion, this provider spent greater than 15 min. face-to-face time with the patient and/or his family dealing with his cholesterol issues, herpes labialis refill medication..  More than half this time was spent in counseling and or coordination of care with other providers or agencies which were consistent with the nature of this patient's problems which are listed and described in the assessment and plan.    The following health maintenance schedule was reviewed with the patient and provided in printed form in the after visit summary:   Health Maintenance   Topic Date Due     COLONOSCOPY  07/14/1963     FALL RISK ASSESSMENT  04/19/2018     ADVANCE DIRECTIVES DISCUSSED WITH PATIENT  04/19/2022     TD 18+ HE  02/13/2023     PNEUMOCOCCAL POLYSACCHARIDE VACCINE AGE 65 AND OVER  Completed      INFLUENZA VACCINE RULE BASED  Completed     PNEUMOCOCCAL CONJUGATE VACCINE FOR ADULTS (PCV13 OR PREVNAR)  Completed     ZOSTER VACCINE  Completed        Subjective:   Chief Complaint: Parviz Joy is an 71 y.o. male here for an Annual Wellness visit.   HPI: In for annual wellness  Doing well  Active, expanding businesses.  Traveling to [facility]  No physical complaints  Hyperlipoproteinemia-patient is tolerating medication.  There are no myalgia, arthralgia, weakness.  No Bowel issues.  Liver profile has been normal.  Patient has met cholesterol goals.    Review of Systems: Good appetite.  Good digestion.  Regular bowel movement.  Levitra effective   please see above.  The rest of the review of systems are negative for all systems.    Patient Care Team:  Zeke Chisholm MD as PCP - General (Internal Medicine)     Patient Active Problem List   Diagnosis     Hyperlipidemia     LANCE on CPAP     Past Medical History:   Diagnosis Date     Colon polyposis      History of liver biopsy     hx abnl lfts....fatty liver. highiron   genetic scrren negative for hemochromatisis   hep c negative     Hyperlipidemia      Sleep apnea 2001      Past Surgical History:   Procedure Laterality Date     APPENDECTOMY      AGE 30     CHOLECYSTECTOMY  1995     KNEE ARTHROSCOPY Left       Family History   Problem Relation Age of Onset     Stroke Mother      Peripheral vascular disease Mother      Diabetes Father      Cancer Father      NASOPHARYNGEAL     Diabetes Brother      Arrhythmia Son       Social History     Social History     Marital status:      Spouse name: N/A     Number of children: N/A     Years of education: N/A     Occupational History     Not on file.     Social History Main Topics     Smoking status: Former Smoker     Quit date: 1969     Smokeless tobacco: Not on file     Alcohol use Yes     Drug use: Not on file     Sexual activity: Not on file     Other Topics Concern     Not on file     Social History  "Narrative    [place] 1968    [place] [employer] [place] [employer]    [employer]    [place] NATIVE    2 CHILDREN    NADYA-ARRHYTHMIA, ECTOPY    ERIN    6 GRANDCHILDREN      Current Outpatient Prescriptions   Medication Sig Dispense Refill     aspirin 81 MG EC tablet Take 162 mg by mouth daily.        atorvastatin (LIPITOR) 20 MG tablet Take 1 tablet (20 mg total) by mouth daily. 90 tablet 3     cholecalciferol, vitamin D3, 1,000 unit tablet Take 1,000 Units by mouth daily.       co-enzyme Q-10 30 mg capsule Take 100 mg by mouth daily.       fluticasone (FLONASE) 50 mcg/actuation nasal spray 1 spray into each nostril as needed.        multivitamin therapeutic (THERAGRAN) tablet Take 1 tablet by mouth daily.       valACYclovir (VALTREX) 1000 MG tablet TAKE 1 TABLET(1000 MG) BY MOUTH TWICE DAILY PRN 30 tablet 6     vardenafil (LEVITRA) 10 MG tablet Take 1 tablet (10 mg total) by mouth daily as needed for erectile dysfunction. 10 tablet 3     No current facility-administered medications for this visit.       Objective:   Vital Signs:   Visit Vitals     /80 (Patient Site: Left Arm, Patient Position: Sitting, Cuff Size: Adult Regular)     Pulse 62     Resp 14     Ht 5' 9.25\" (1.759 m)     Wt 200 lb 0.6 oz (90.7 kg)     SpO2 98%     BMI 29.33 kg/m2        VisionScreening:  No exam data present     PHYSICAL EXAM  Skin: Normal. No rash or lesion  Lymph Nodes: None palpable-including neck, axilla, inguinal, epitrochlear.  Head:  Normocephalic.    Eyes: Midline.  Equal size., full ROM.  External exams normal.  No icterus  Ears:  Normal pinnae, canals, and TM's.    Nose:  Patent, without deformity.    Throat:  Moist mucous membranes without lesions, erythema, or exudate.    Neck: No palpable masses, lymphadenopathy or tenderness.No thyromegaly or goiter.  No thyroid nodule.  Carotid Arteries:  No Bruit.  Carotid upstroke normal  Chest Wall: No deformity or pain elicited on " compression.  Respiratory:  Normal respiratory effort.  Lungs are clear with good breath sounds.  No dullness.  No wheezing.  Heart: Regular rhythm.  Normal sounding S1, S2 without S3, S4, murmurs, rubs, or gallops.    Abdomen:  The abdomen was flat, soft and nontender without guarding rebound or masses.  There are normal bowel sounds.  There is no hepatosplenomegaly.  There is no palpable enlargement of the aorta.  Genitalia: uncircumcised male without penile or testicular lesions.  No hernia.  Rectal/Prostate:  No external lesions.  Sphincter tone normal.  No palpable rectal lesions.    Prostate 2/4 BPH without nodule, smooth, nontender without palpable lesions.    Extremities:  Full ROM without limitation, deformity or edema.    4+ pulses  Neurologic-intact- No focal deficit.  Speech clear.  Coordination normal.  Strength symmetric  Orthopedic-no arthropathy.      Assessment Results 4/19/2017   Activities of Daily Living No help needed   Instrumental Activities of Daily Living No help needed   Get Up and Go Score Less than 12 seconds     A Mini-Cog score of 0-2 suggests the possibility of dementia, score of 3-5 suggests no dementia    Identified Health Risks:     He is at risk for lack of exercise and has been provided with information to increase physical activity for the benefit of his well-being.  Patient's advanced directive was discussed and I am comfortable with the patient's wishes.

## 2021-06-16 PROBLEM — R00.0 TACHYCARDIA: Status: ACTIVE | Noted: 2019-09-12

## 2021-06-17 NOTE — PROGRESS NOTES
Assessment and Plan:        1. Medicare annual wellness visit, subsequent  Completed wellness examination and digital rectal examination    2. Hyperlipidemia  On treatment-primary prevention.  No issues   - atorvastatin (LIPITOR) 20 MG tablet; Take 1 tablet (20 mg total) by mouth daily.  Dispense: 90 tablet; Refill: 3  - valACYclovir (VALTREX) 1000 MG tablet; TAKE 1 TABLET(1000 MG) BY MOUTH TWICE DAILY PRN  Dispense: 30 tablet; Refill: 6  - Electrocardiogram Perform - Clinic    3. Herpes labialis   on as needed Valtrex  - valACYclovir (VALTREX) 1000 MG tablet; TAKE 1 TABLET(1000 MG) BY MOUTH TWICE DAILY PRN  Dispense: 30 tablet; Refill: 6    4. LANCE (obstructive sleep apnea)  Using his CPAP in a compliant fashion    5.  Borderline blood pressure.  He will be back in 3 months to check this.  He needs to lose weight and watch his salt.      In addition to the wellness examination additional time was spent addressing the following listed problems.   Hyperlipidemia, obstructive sleep apnea, completing physical examination, medication refills    In doing so, this provider spent greater than 25 min. face-to-face time with the patient and/or his family.  More than half this time was spent in counseling and or coordination of care with other providers or agencies which were consistent with the nature of this patient's problems which are listed and described in the assessment and plan.          Zeke Chisholm MD  Internal medicine  [facility]  [phone]  [email or web address]        The patient's current medical problems were reviewed.      The following health maintenance schedule was reviewed with the patient and provided in printed form in the after visit summary:   Health Maintenance   Topic Date Due     INFLUENZA VACCINE RULE BASED (1) 08/01/2017     FALL RISK ASSESSMENT  04/30/2019     ADVANCE DIRECTIVES DISCUSSED WITH PATIENT  04/19/2022     TD 18+ HE  02/13/2023      COLONOSCOPY  11/13/2024     PNEUMOCOCCAL POLYSACCHARIDE VACCINE AGE 65 AND OVER  Completed     PNEUMOCOCCAL CONJUGATE VACCINE FOR ADULTS (PCV13 OR PREVNAR)  Completed     ZOSTER VACCINE  Completed        Subjective:   Chief Complaint: Parviz Joy is an 72 y.o. male here for an Annual Wellness visit.   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  There are no myalgia, arthralgia, weakness.  No Bowel issues.  Liver profile has been normal.  Patient has met cholesterol goals.  He does travel a lot.  He has gained weight.  He might be consuming more salt than usual.      Review of Systems:      Compliant with his CPAP machine.  Good sleep.  No daytime issues    Uses Valtrex as needed        Please see above.  The rest of the review of systems are negative for all systems.    Patient Care Team:  Zeke Chisholm MD as PCP - General (Internal Medicine)     Patient Active Problem List   Diagnosis     Hyperlipidemia     LANCE on CPAP     Past Medical History:   Diagnosis Date     Colon polyposis      History of liver biopsy     hx abnl lfts....fatty liver. highiron   genetic scrren negative for hemochromatisis   hep c negative     Hyperlipidemia      Sleep apnea 2001      Past Surgical History:   Procedure Laterality Date     APPENDECTOMY      AGE 30     CHOLECYSTECTOMY  1995     KNEE ARTHROSCOPY Left       Family History   Problem Relation Age of Onset     Stroke Mother      Peripheral vascular disease Mother      Diabetes Father      Cancer Father      NASOPHARYNGEAL     Diabetes Brother      Arrhythmia Son       Social History     Social History     Marital status:      Spouse name: N/A     Number of children: N/A     Years of education: N/A     Occupational History     Not on file.     Social History Main Topics     Smoking status: Former Smoker     Quit  date: 1969     Smokeless tobacco: Not on file     Alcohol use Yes     Drug use: Not on file     Sexual activity: Not on file     Other Topics Concern     Not on file     Social History Narrative    [place] 1968    [place] [employer] [facility]    [employer]    [place] NATIVE    2 CHILDREN    NADYA-ELAN, ECTOPY    ERIN    6 GRANDCHILDREN      Current Outpatient Prescriptions   Medication Sig Dispense Refill     aspirin 81 MG EC tablet Take 162 mg by mouth daily.        atorvastatin (LIPITOR) 20 MG tablet Take 1 tablet (20 mg total) by mouth daily. 90 tablet 3     cholecalciferol, vitamin D3, 1,000 unit tablet Take 1,000 Units by mouth daily.       co-enzyme Q-10 30 mg capsule Take 100 mg by mouth daily.       fluticasone (FLONASE) 50 mcg/actuation nasal spray 1 spray into each nostril as needed.        multivitamin therapeutic (THERAGRAN) tablet Take 1 tablet by mouth daily.       valACYclovir (VALTREX) 1000 MG tablet TAKE 1 TABLET(1000 MG) BY MOUTH TWICE DAILY PRN 30 tablet 6     No current facility-administered medications for this visit.       Objective:   Vital Signs:   Visit Vitals     /80      BP Readings from Last 10 Encounters:   04/30/18 150/80   04/19/17 136/80   04/13/16 134/76     Vitals:    04/30/18 0753 04/30/18 0813   BP: 145/80 150/80           VisionScreening:  No exam data present     PHYSICAL EXAM  Skin: Normal. No rash or lesion  Lymph Nodes: None palpable-including neck, axilla, inguinal, epitrochlear.  Head:  Normocephalic.    Eyes: Midline.  Equal size., full ROM.  External exams normal.  No icterus  Ears:  Normal pinnae, canals, and TM's.    Nose:  Patent, without deformity.    Throat:  Moist mucous membranes without lesions, erythema, or exudate.    Neck: No palpable masses, lymphadenopathy or tenderness.No thyromegaly or goiter.  No thyroid nodule.  Carotid Arteries:  No Bruit.  Carotid upstroke normal  Chest Wall: No  deformity or pain elicited on compression.  Respiratory:  Normal respiratory effort.  Lungs are clear with good breath sounds.  No dullness.  No wheezing.  Heart: Regular rhythm.  Normal sounding S1, S2 without S3, S4, murmurs, rubs, or gallops.    Abdomen:  The abdomen was flat, soft and nontender without guarding rebound or masses.  There are normal bowel sounds.  There is no hepatosplenomegaly.  There is no palpable enlargement of the aorta.  Genitalia: Uncircumcised male without penile or testicular lesions.  No hernia.  Rectal/Prostate:  No external lesions.  Sphincter tone normal.  No palpable rectal lesions.    Prostate 1-2/4 BPH without nodule, smooth, nontender without palpable lesions.  Extremities:  Full ROM without limitation, deformity or edema.    4+ pulses  Neurologic-intact- No focal deficit.  Speech clear.  Coordination normal.  Strength symmetric  Orthopedic-no arthropathy.            Lab Results   Component Value Date    CHOL 224 (H) 04/26/2018    CHOL 220 (H) 04/13/2017    CHOL 218 (H) 04/07/2016     Lab Results   Component Value Date    HDL 60 04/26/2018    HDL 66 04/13/2017    HDL 59 04/07/2016     Lab Results   Component Value Date    LDLCALC 145 (H) 04/26/2018    LDLCALC 135 (H) 04/13/2017    LDLCALC 140 (H) 04/07/2016     Lab Results   Component Value Date    TRIG 95 04/26/2018    TRIG 95 04/13/2017    TRIG 95 04/07/2016     No components found for: CHOLHDL    Assessment Results 4/30/2018   Activities of Daily Living No help needed   Instrumental Activities of Daily Living No help needed   Get Up and Go Score Less than 12 seconds   Mini Cog Total Score 5   Some recent data might be hidden     A Mini-Cog score of 0-2 suggests the possibility of dementia, score of 3-5 suggests no dementia    Identified Health Risks:     He is at risk for lack of exercise and has been provided with information to increase physical activity for the benefit of his well-being.  Patient's advanced directive was  discussed and I am comfortable with the patient's wishes.

## 2021-06-19 NOTE — LETTER
Letter by Parviz Sierra MD at      Author: Parviz Sierra MD Service: -- Author Type: --    Filed:  Encounter Date: 7/22/2019 Status: (Other)         Parviz Joy  [id number] [place] [id number]             July 22, 2019         Dear Mr. Joy,    Below are the results from your recent visit:    Resulted Orders   HM2(CBC w/o Differential)   Result Value Ref Range    WBC 5.0 4.0 - 11.0 thou/uL    RBC 4.97 4.40 - 6.20 mill/uL    Hemoglobin 15.6 14.0 - 18.0 g/dL    Hematocrit 45.4 40.0 - 54.0 %    MCV 91 80 - 100 fL    MCH 31.3 27.0 - 34.0 pg    MCHC 34.3 32.0 - 36.0 g/dL    RDW 11.9 11.0 - 14.5 %    Platelets 195 140 - 440 thou/uL    MPV 7.2 7.0 - 10.0 fL   Comprehensive Metabolic Panel   Result Value Ref Range    Sodium 141 136 - 145 mmol/L    Potassium 4.7 3.5 - 5.0 mmol/L    Chloride 103 98 - 107 mmol/L    CO2 29 22 - 31 mmol/L    Anion Gap, Calculation 9 5 - 18 mmol/L    Glucose 100 70 - 125 mg/dL    BUN 19 8 - 28 mg/dL    Creatinine 0.82 0.70 - 1.30 mg/dL    GFR MDRD Af Amer >60 >60 mL/min/1.73m2    GFR MDRD Non Af Amer >60 >60 mL/min/1.73m2    Bilirubin, Total 0.8 0.0 - 1.0 mg/dL    Calcium 10.2 8.5 - 10.5 mg/dL    Protein, Total 6.8 6.0 - 8.0 g/dL    Albumin 4.3 3.5 - 5.0 g/dL    Alkaline Phosphatase 71 45 - 120 U/L    AST 24 0 - 40 U/L    ALT 30 0 - 45 U/L    Narrative    Fasting Glucose reference range is 70-99 mg/dL per  American Diabetes Association (ADA) guidelines.   Lipid Cascade   Result Value Ref Range    Cholesterol 215 (H) <=199 mg/dL    Triglycerides 103 <=149 mg/dL    HDL Cholesterol 68 >=40 mg/dL    LDL Calculated 126 <=129 mg/dL    Patient Fasting > 8hrs? Yes    Thyroid Stimulating Hormone (TSH)   Result Value Ref Range    TSH 0.93 0.30 - 5.00 uIU/mL   Urinalysis-UC if Indicated   Result Value Ref Range    Color, UA Yellow Colorless, Yellow, Straw, Light Yellow    Clarity, UA Clear Clear    Glucose, UA Negative Negative    Bilirubin, UA Negative Negative    Ketones, UA  Negative Negative    Specific Gravity, UA 1.020 1.005 - 1.030    Blood, UA Trace (!) Negative    pH, UA 7.0 5.0 - 8.0    Protein, UA Negative Negative mg/dL    Urobilinogen, UA 0.2 E.U./dL 0.2 E.U./dL, 1.0 E.U./dL    Nitrite, UA Negative Negative    Leukocytes, UA Negative Negative    Bacteria, UA None Seen None Seen hpf    RBC, UA 0-2 None Seen, 0-2 hpf    WBC, UA None Seen None Seen, 0-5 hpf    Squam Epithel, UA None Seen None Seen, 0-5 lpf    Narrative    UC not indicated   PSA (Prostatic-Specific Antigen), Annual Screen   Result Value Ref Range    PSA 1.6 0.0 - 6.5 ng/mL    Narrative    Method is Abbott Prostate-Specific Antigen (PSA)  Standard-WHO 1st International (90:10)       All very good results    Please call with questions or contact us using [employer].    Sincerely,        Electronically signed by Parviz Sierra MD

## 2021-09-11 ENCOUNTER — HEALTH MAINTENANCE LETTER (OUTPATIENT)
Age: 76
End: 2021-09-11

## 2022-06-18 ENCOUNTER — HEALTH MAINTENANCE LETTER (OUTPATIENT)
Age: 77
End: 2022-06-18

## 2022-10-30 ENCOUNTER — HEALTH MAINTENANCE LETTER (OUTPATIENT)
Age: 77
End: 2022-10-30

## 2023-01-31 ENCOUNTER — LAB REQUISITION (OUTPATIENT)
Dept: LAB | Facility: CLINIC | Age: 78
End: 2023-01-31
Payer: MEDICARE

## 2023-01-31 DIAGNOSIS — I10 ESSENTIAL (PRIMARY) HYPERTENSION: ICD-10-CM

## 2023-01-31 DIAGNOSIS — Z12.5 ENCOUNTER FOR SCREENING FOR MALIGNANT NEOPLASM OF PROSTATE: ICD-10-CM

## 2023-01-31 LAB
ALBUMIN SERPL BCG-MCNC: 4.6 G/DL (ref 3.5–5.2)
ALP SERPL-CCNC: 90 U/L (ref 40–129)
ALT SERPL W P-5'-P-CCNC: 37 U/L (ref 10–50)
ANION GAP SERPL CALCULATED.3IONS-SCNC: 9 MMOL/L (ref 7–15)
AST SERPL W P-5'-P-CCNC: 30 U/L (ref 10–50)
BILIRUB SERPL-MCNC: 0.6 MG/DL
BUN SERPL-MCNC: 16.6 MG/DL (ref 8–23)
CALCIUM SERPL-MCNC: 10.1 MG/DL (ref 8.8–10.2)
CHLORIDE SERPL-SCNC: 101 MMOL/L (ref 98–107)
CREAT SERPL-MCNC: 1 MG/DL (ref 0.67–1.17)
DEPRECATED HCO3 PLAS-SCNC: 30 MMOL/L (ref 22–29)
GFR SERPL CREATININE-BSD FRML MDRD: 78 ML/MIN/1.73M2
GLUCOSE SERPL-MCNC: 110 MG/DL (ref 70–99)
POTASSIUM SERPL-SCNC: 4.8 MMOL/L (ref 3.4–5.3)
PROT SERPL-MCNC: 6.8 G/DL (ref 6.4–8.3)
PSA SERPL-MCNC: 13.5 NG/ML (ref 0–6.5)
SODIUM SERPL-SCNC: 140 MMOL/L (ref 136–145)
TSH SERPL DL<=0.005 MIU/L-ACNC: 1.35 UIU/ML (ref 0.3–4.2)

## 2023-01-31 PROCEDURE — 80053 COMPREHEN METABOLIC PANEL: CPT | Mod: ORL | Performed by: FAMILY MEDICINE

## 2023-01-31 PROCEDURE — G0103 PSA SCREENING: HCPCS | Mod: ORL | Performed by: FAMILY MEDICINE

## 2023-01-31 PROCEDURE — 84443 ASSAY THYROID STIM HORMONE: CPT | Mod: ORL | Performed by: FAMILY MEDICINE

## 2023-03-13 ENCOUNTER — LAB REQUISITION (OUTPATIENT)
Dept: LAB | Facility: CLINIC | Age: 78
End: 2023-03-13
Payer: MEDICARE

## 2023-03-13 DIAGNOSIS — R97.20 ELEVATED PROSTATE SPECIFIC ANTIGEN (PSA): ICD-10-CM

## 2023-03-13 LAB
PSA FREE MFR SERPL: 23.53 %
PSA FREE SERPL-MCNC: 1 NG/ML
PSA SERPL DL<=0.01 NG/ML-MCNC: 4.25 NG/ML (ref 0–6.5)

## 2023-03-13 PROCEDURE — 84154 ASSAY OF PSA FREE: CPT | Mod: ORL | Performed by: FAMILY MEDICINE

## 2023-03-13 PROCEDURE — 84153 ASSAY OF PSA TOTAL: CPT | Mod: ORL | Performed by: FAMILY MEDICINE

## 2023-06-25 ENCOUNTER — HEALTH MAINTENANCE LETTER (OUTPATIENT)
Age: 78
End: 2023-06-25

## 2024-02-19 ENCOUNTER — LAB REQUISITION (OUTPATIENT)
Dept: LAB | Facility: CLINIC | Age: 79
End: 2024-02-19
Payer: MEDICARE

## 2024-02-19 DIAGNOSIS — E55.9 VITAMIN D DEFICIENCY, UNSPECIFIED: ICD-10-CM

## 2024-02-19 DIAGNOSIS — E78.5 HYPERLIPIDEMIA, UNSPECIFIED: ICD-10-CM

## 2024-02-19 DIAGNOSIS — I10 ESSENTIAL (PRIMARY) HYPERTENSION: ICD-10-CM

## 2024-02-19 LAB
ALBUMIN SERPL BCG-MCNC: 4.6 G/DL (ref 3.5–5.2)
ALP SERPL-CCNC: 94 U/L (ref 40–150)
ALT SERPL W P-5'-P-CCNC: 29 U/L (ref 0–70)
ANION GAP SERPL CALCULATED.3IONS-SCNC: 11 MMOL/L (ref 7–15)
AST SERPL W P-5'-P-CCNC: 28 U/L (ref 0–45)
BILIRUB SERPL-MCNC: 0.7 MG/DL
BUN SERPL-MCNC: 15.2 MG/DL (ref 8–23)
CALCIUM SERPL-MCNC: 9.5 MG/DL (ref 8.8–10.2)
CHLORIDE SERPL-SCNC: 102 MMOL/L (ref 98–107)
CHOLEST SERPL-MCNC: 214 MG/DL
CREAT SERPL-MCNC: 0.84 MG/DL (ref 0.67–1.17)
DEPRECATED HCO3 PLAS-SCNC: 26 MMOL/L (ref 22–29)
EGFRCR SERPLBLD CKD-EPI 2021: 89 ML/MIN/1.73M2
FASTING STATUS PATIENT QL REPORTED: ABNORMAL
GLUCOSE SERPL-MCNC: 109 MG/DL (ref 70–99)
HDLC SERPL-MCNC: 75 MG/DL
LDLC SERPL CALC-MCNC: 122 MG/DL
NONHDLC SERPL-MCNC: 139 MG/DL
POTASSIUM SERPL-SCNC: 4.1 MMOL/L (ref 3.4–5.3)
PROT SERPL-MCNC: 7.1 G/DL (ref 6.4–8.3)
SODIUM SERPL-SCNC: 139 MMOL/L (ref 135–145)
TRIGL SERPL-MCNC: 84 MG/DL
TSH SERPL DL<=0.005 MIU/L-ACNC: 0.83 UIU/ML (ref 0.3–4.2)
VIT D+METAB SERPL-MCNC: 50 NG/ML (ref 20–50)

## 2024-02-19 PROCEDURE — 80053 COMPREHEN METABOLIC PANEL: CPT | Mod: ORL | Performed by: FAMILY MEDICINE

## 2024-02-19 PROCEDURE — 80061 LIPID PANEL: CPT | Mod: ORL | Performed by: FAMILY MEDICINE

## 2024-02-19 PROCEDURE — 82306 VITAMIN D 25 HYDROXY: CPT | Mod: ORL | Performed by: FAMILY MEDICINE

## 2024-02-19 PROCEDURE — 84443 ASSAY THYROID STIM HORMONE: CPT | Mod: ORL | Performed by: FAMILY MEDICINE

## 2025-02-18 ENCOUNTER — LAB REQUISITION (OUTPATIENT)
Dept: LAB | Facility: CLINIC | Age: 80
End: 2025-02-18
Payer: MEDICARE

## 2025-02-18 DIAGNOSIS — E78.5 HYPERLIPIDEMIA, UNSPECIFIED: ICD-10-CM

## 2025-02-18 DIAGNOSIS — G62.9 POLYNEUROPATHY, UNSPECIFIED: ICD-10-CM

## 2025-02-18 DIAGNOSIS — E55.9 VITAMIN D DEFICIENCY, UNSPECIFIED: ICD-10-CM

## 2025-02-18 DIAGNOSIS — I10 ESSENTIAL (PRIMARY) HYPERTENSION: ICD-10-CM

## 2025-02-18 LAB
ALBUMIN SERPL BCG-MCNC: 4.3 G/DL (ref 3.5–5.2)
ALP SERPL-CCNC: 84 U/L (ref 40–150)
ALT SERPL W P-5'-P-CCNC: 29 U/L (ref 0–70)
ANION GAP SERPL CALCULATED.3IONS-SCNC: 14 MMOL/L (ref 7–15)
AST SERPL W P-5'-P-CCNC: 27 U/L (ref 0–45)
BILIRUB SERPL-MCNC: 0.4 MG/DL
BUN SERPL-MCNC: 20.4 MG/DL (ref 8–23)
CALCIUM SERPL-MCNC: 9.6 MG/DL (ref 8.8–10.4)
CHLORIDE SERPL-SCNC: 103 MMOL/L (ref 98–107)
CHOLEST SERPL-MCNC: 219 MG/DL
CREAT SERPL-MCNC: 0.92 MG/DL (ref 0.67–1.17)
EGFRCR SERPLBLD CKD-EPI 2021: 85 ML/MIN/1.73M2
FASTING STATUS PATIENT QL REPORTED: YES
FASTING STATUS PATIENT QL REPORTED: YES
GLUCOSE SERPL-MCNC: 109 MG/DL (ref 70–99)
HCO3 SERPL-SCNC: 24 MMOL/L (ref 22–29)
HDLC SERPL-MCNC: 71 MG/DL
LDLC SERPL CALC-MCNC: 134 MG/DL
NONHDLC SERPL-MCNC: 148 MG/DL
POTASSIUM SERPL-SCNC: 4.2 MMOL/L (ref 3.4–5.3)
PROT SERPL-MCNC: 6.9 G/DL (ref 6.4–8.3)
SODIUM SERPL-SCNC: 141 MMOL/L (ref 135–145)
TRIGL SERPL-MCNC: 69 MG/DL
TSH SERPL DL<=0.005 MIU/L-ACNC: 1.05 UIU/ML (ref 0.3–4.2)
VIT B12 SERPL-MCNC: 1629 PG/ML (ref 232–1245)
VIT D+METAB SERPL-MCNC: 49 NG/ML (ref 20–50)